# Patient Record
Sex: MALE | Race: WHITE | URBAN - METROPOLITAN AREA
[De-identification: names, ages, dates, MRNs, and addresses within clinical notes are randomized per-mention and may not be internally consistent; named-entity substitution may affect disease eponyms.]

---

## 2020-07-15 ENCOUNTER — OFFICE VISIT (OUTPATIENT)
Dept: FAMILY MEDICINE CLINIC | Facility: CLINIC | Age: 35
End: 2020-07-15

## 2020-07-15 ENCOUNTER — HOSPITAL ENCOUNTER (OUTPATIENT)
Dept: LAB | Age: 35
Discharge: HOME | End: 2020-07-15
Payer: SELF-PAY

## 2020-07-15 VITALS
RESPIRATION RATE: 16 BRPM | HEIGHT: 72 IN | TEMPERATURE: 98 F | WEIGHT: 193.2 LBS | OXYGEN SATURATION: 95 % | SYSTOLIC BLOOD PRESSURE: 120 MMHG | BODY MASS INDEX: 26.17 KG/M2 | DIASTOLIC BLOOD PRESSURE: 83 MMHG | HEART RATE: 76 BPM

## 2020-07-15 DIAGNOSIS — R35.0 INCREASED URINARY FREQUENCY: ICD-10-CM

## 2020-07-15 DIAGNOSIS — R53.81 MALAISE: ICD-10-CM

## 2020-07-15 DIAGNOSIS — Z82.61 FAMILY HISTORY OF RHEUMATOID ARTHRITIS: ICD-10-CM

## 2020-07-15 DIAGNOSIS — R42 DIZZINESS: ICD-10-CM

## 2020-07-15 DIAGNOSIS — Z76.89 ESTABLISHING CARE WITH NEW DOCTOR, ENCOUNTER FOR: ICD-10-CM

## 2020-07-15 DIAGNOSIS — M79.10 MYALGIA: Primary | ICD-10-CM

## 2020-07-15 DIAGNOSIS — M79.10 MYALGIA: ICD-10-CM

## 2020-07-15 LAB
25(OH)D3 SERPL-MCNC: 34.6 NG/ML (ref 30–100)
ALBUMIN SERPL-MCNC: 4.3 G/DL (ref 3.4–5)
ALBUMIN/GLOB SERPL: 1.5 {RATIO} (ref 0.8–1.7)
ALP SERPL-CCNC: 58 U/L (ref 45–117)
ALT SERPL-CCNC: 26 U/L (ref 16–61)
ANION GAP SERPL CALC-SCNC: 6 MMOL/L (ref 3–18)
AST SERPL-CCNC: 17 U/L (ref 10–38)
BASOPHILS # BLD: 0 K/UL (ref 0–0.1)
BASOPHILS NFR BLD: 0 % (ref 0–2)
BILIRUB SERPL-MCNC: 0.4 MG/DL (ref 0.2–1)
BUN SERPL-MCNC: 16 MG/DL (ref 7–18)
BUN/CREAT SERPL: 14 (ref 12–20)
CALCIUM SERPL-MCNC: 9.2 MG/DL (ref 8.5–10.1)
CHLORIDE SERPL-SCNC: 110 MMOL/L (ref 100–111)
CK SERPL-CCNC: 117 U/L (ref 39–308)
CO2 SERPL-SCNC: 29 MMOL/L (ref 21–32)
CREAT SERPL-MCNC: 1.16 MG/DL (ref 0.6–1.3)
DIFFERENTIAL METHOD BLD: ABNORMAL
EOSINOPHIL # BLD: 0.2 K/UL (ref 0–0.4)
EOSINOPHIL NFR BLD: 3 % (ref 0–5)
ERYTHROCYTE [DISTWIDTH] IN BLOOD BY AUTOMATED COUNT: 13.6 % (ref 11.6–14.5)
ERYTHROCYTE [SEDIMENTATION RATE] IN BLOOD: 1 MM/HR (ref 0–15)
EST. AVERAGE GLUCOSE BLD GHB EST-MCNC: 100 MG/DL
GLOBULIN SER CALC-MCNC: 2.9 G/DL (ref 2–4)
GLUCOSE SERPL-MCNC: 75 MG/DL (ref 74–99)
HBA1C MFR BLD: 5.1 % (ref 4.2–5.6)
HCT VFR BLD AUTO: 49.7 % (ref 36–48)
HGB BLD-MCNC: 15.9 G/DL (ref 13–16)
LYMPHOCYTES # BLD: 2.2 K/UL (ref 0.9–3.6)
LYMPHOCYTES NFR BLD: 35 % (ref 21–52)
MCH RBC QN AUTO: 30.1 PG (ref 24–34)
MCHC RBC AUTO-ENTMCNC: 32 G/DL (ref 31–37)
MCV RBC AUTO: 94 FL (ref 74–97)
MONOCYTES # BLD: 0.4 K/UL (ref 0.05–1.2)
MONOCYTES NFR BLD: 7 % (ref 3–10)
NEUTS SEG # BLD: 3.5 K/UL (ref 1.8–8)
NEUTS SEG NFR BLD: 55 % (ref 40–73)
PLATELET # BLD AUTO: 371 K/UL (ref 135–420)
PMV BLD AUTO: 10.5 FL (ref 9.2–11.8)
POTASSIUM SERPL-SCNC: 4.6 MMOL/L (ref 3.5–5.5)
PROT SERPL-MCNC: 7.2 G/DL (ref 6.4–8.2)
RBC # BLD AUTO: 5.29 M/UL (ref 4.7–5.5)
RHEUMATOID FACT SERPL-ACNC: <10 IU/ML
SODIUM SERPL-SCNC: 145 MMOL/L (ref 136–145)
TSH SERPL DL<=0.05 MIU/L-ACNC: 0.44 UIU/ML (ref 0.36–3.74)
VIT B12 SERPL-MCNC: 513 PG/ML (ref 211–911)
WBC # BLD AUTO: 6.3 K/UL (ref 4.6–13.2)

## 2020-07-15 PROCEDURE — 86225 DNA ANTIBODY NATIVE: CPT

## 2020-07-15 PROCEDURE — 80053 COMPREHEN METABOLIC PANEL: CPT

## 2020-07-15 PROCEDURE — 86431 RHEUMATOID FACTOR QUANT: CPT

## 2020-07-15 PROCEDURE — 85025 COMPLETE CBC W/AUTO DIFF WBC: CPT

## 2020-07-15 PROCEDURE — 82607 VITAMIN B-12: CPT

## 2020-07-15 PROCEDURE — 82550 ASSAY OF CK (CPK): CPT

## 2020-07-15 PROCEDURE — 36415 COLL VENOUS BLD VENIPUNCTURE: CPT

## 2020-07-15 PROCEDURE — 83036 HEMOGLOBIN GLYCOSYLATED A1C: CPT

## 2020-07-15 PROCEDURE — 84443 ASSAY THYROID STIM HORMONE: CPT

## 2020-07-15 PROCEDURE — 85652 RBC SED RATE AUTOMATED: CPT

## 2020-07-15 PROCEDURE — 82306 VITAMIN D 25 HYDROXY: CPT

## 2020-07-15 RX ORDER — BISMUTH SUBSALICYLATE 262 MG
1 TABLET,CHEWABLE ORAL DAILY
COMMUNITY

## 2020-07-16 NOTE — PATIENT INSTRUCTIONS
Musculoskeletal Pain: Care Instructions  Your Care Instructions  Different problems with the bones, muscles, nerves, ligaments, and tendons in the body can cause pain. One or more areas of your body may ache or burn. Or they may feel tired, stiff, or sore. The medical term for this type of pain is musculoskeletal pain. It can have many different causes. Sometimes the pain is caused by an injury such as a strain or sprain. Or you might have pain from using one part of your body in the same way over and over again. This is called overuse. In some cases, the cause of the pain is another health problem such as arthritis or fibromyalgia. The doctor will examine you and ask you questions about your health to help find the cause of your pain. Blood tests or imaging tests like an X-ray may also be helpful. But sometimes doctors can't find a cause of the pain. Treatment depends on your symptoms and the cause of the pain, if known. The doctor has checked you carefully, but problems can develop later. If you notice any problems or new symptoms, get medical treatment right away. Follow-up care is a key part of your treatment and safety. Be sure to make and go to all appointments, and call your doctor if you are having problems. It's also a good idea to know your test results and keep a list of the medicines you take. How can you care for yourself at home? · Rest until you feel better. · Do not do anything that makes the pain worse. Return to exercise gradually if you feel better and your doctor says it's okay. · Be safe with medicines. Read and follow all instructions on the label. ¨ If the doctor gave you a prescription medicine for pain, take it as prescribed. ¨ If you are not taking a prescription pain medicine, ask your doctor if you can take an over-the-counter medicine. · Put ice or a cold pack on the area for 10 to 20 minutes at a time to ease pain. Put a thin cloth between the ice and your skin.   When should you call for help? Call your doctor now or seek immediate medical care if:  · You have new pain, or your pain gets worse. · You have new symptoms such as a fever, a rash, or chills. Watch closely for changes in your health, and be sure to contact your doctor if:  · You do not get better as expected. Where can you learn more? Go to [employer]  Enter Q624 in the search box to learn more about \"Musculoskeletal Pain: Care Instructions. \"   © 9579-5565 [employer]. Care instructions adapted under license by [facility] (which disclaims liability or warranty for this information). This care instruction is for use with your licensed healthcare professional. If you have questions about a medical condition or this instruction, always ask your healthcare professional. [address] any warranty or liability for your use of this information.   Content Version: 09.7.789071; Current as of: November 20, 2015

## 2020-07-17 LAB
CENTROMERE B AB SER-ACNC: <0.2 AI (ref 0–0.9)
CHROMATIN AB SERPL-ACNC: <0.2 AI (ref 0–0.9)
DSDNA AB SER-ACNC: <1 IU/ML (ref 0–9)
ENA JO1 AB SER-ACNC: <0.2 AI (ref 0–0.9)
ENA RNP AB SER-ACNC: 2 AI (ref 0–0.9)
ENA SCL70 AB SER-ACNC: <0.2 AI (ref 0–0.9)
ENA SM AB SER-ACNC: <0.2 AI (ref 0–0.9)
ENA SS-A AB SER-ACNC: <0.2 AI (ref 0–0.9)
ENA SS-B AB SER-ACNC: <0.2 AI (ref 0–0.9)
SEE BELOW, 164869: ABNORMAL

## 2020-07-19 NOTE — PROGRESS NOTES
HISTORY OF PRESENT ILLNESS  Jay Gaffney is a 38 y.o. male. HPI   Pt is being seen to [facility] care and for a few concerns. He has had muscle pain and tightness off and on the past few yrs along with malaise an ddizziness. He has tried to improve it naturally and has cut out alcohol, increased his water intake, regularly exercises, and has started taking magnesium and multivit as well as tumeric. His symptoms greatly improved but are still there at times. He is concerned and he does have a strong fam hx of RA (mom, aunt, and grandmother). He also has had some urinary frequency. Denies dysuria, hematuria, pelvic pain, flank pain, N/V/D, and decreased urinary stream.    No Known Allergies    Current Outpatient Medications   Medication Sig    multivitamin (ONE A DAY) tablet Take 1 Tab by mouth daily. No current facility-administered medications for this visit. Review of Systems   Constitutional: Positive for malaise/fatigue. Negative for chills and fever. HENT: Negative. Negative for congestion, ear pain, sore throat and tinnitus. Eyes: Negative. Negative for blurred vision, double vision and photophobia. Respiratory: Negative. Negative for cough, shortness of breath and wheezing. Cardiovascular: Negative. Negative for chest pain, palpitations and leg swelling. Gastrointestinal: Negative. Negative for abdominal pain, heartburn, nausea and vomiting. Genitourinary: Positive for frequency. Negative for dysuria, hematuria and urgency. Musculoskeletal: Positive for myalgias. Negative for back pain, joint pain and neck pain. Skin: Negative. Negative for itching and rash. Neurological: Positive for dizziness. Negative for tingling, tremors and headaches. Psychiatric/Behavioral: Negative. Negative for depression and memory loss. The patient is not nervous/anxious and does not have insomnia.       Visit Vitals  /83 (BP 1 Location: Left arm, BP Patient Position: Sitting)   Pulse 76 Temp 98 °F (36.7 °C) (Temporal)   Resp 16   Ht 6' (1.829 m)   Wt 193 lb 3.2 oz (87.6 kg)   SpO2 95%   BMI 26.20 kg/m²       Physical Exam  Constitutional:       General: He is not in acute distress. Appearance: Normal appearance. He is well-developed and normal weight. He is not ill-appearing. HENT:      Head: Normocephalic and atraumatic. Right Ear: External ear normal.      Left Ear: External ear normal.      Nose: Nose normal.   Eyes:      Conjunctiva/sclera: Conjunctivae normal.      Pupils: Pupils are equal, round, and reactive to light. Neck:      Musculoskeletal: Normal range of motion and neck supple. Thyroid: No thyromegaly. Cardiovascular:      Rate and Rhythm: Normal rate and regular rhythm. Heart sounds: Normal heart sounds. No murmur. No friction rub. No gallop. Pulmonary:      Effort: Pulmonary effort is normal. No respiratory distress. Breath sounds: Normal breath sounds. No wheezing or rales. Abdominal:      General: Bowel sounds are normal. There is no distension. Palpations: Abdomen is soft. There is no mass. Tenderness: There is no abdominal tenderness. There is no guarding or rebound. Musculoskeletal: Normal range of motion. General: No tenderness. Skin:     General: Skin is warm and dry. Neurological:      Mental Status: He is alert and oriented to person, place, and time. Psychiatric:         Behavior: Behavior normal.         ASSESSMENT and PLAN    ICD-10-CM ICD-9-CM    1. Myalgia  L73.18 254.1 METABOLIC PANEL, COMPREHENSIVE      CK      SED RATE (ESR)      RHEUMATOID FACTOR, QT      MARTIN COMPREHENSIVE PANEL   2. Dizziness  R42 780.4    3. Malaise  I40.72 306.51 METABOLIC PANEL, COMPREHENSIVE      TSH 3RD GENERATION      CBC WITH AUTOMATED DIFF      VITAMIN B12      VITAMIN D, 25 HYDROXY   4. Increased urinary frequency  R35.0 788.41 HEMOGLOBIN A1C WITH EAG   5.  Family history of rheumatoid arthritis  Z82.61 V17.7 CK      SED RATE (ESR)      RHEUMATOID FACTOR, QT      MARTIN COMPREHENSIVE PANEL   6. Establishing care with new doctor, encounter for  Z76.89 V65.8      Follow-up and Dispositions    · Return in about 1 week (around 7/22/2020) for follow up labs. Pt expressed understanding of visit summary and plans for any follow ups or referrals as well as any medications prescribed.

## 2020-09-09 ENCOUNTER — TELEPHONE (OUTPATIENT)
Dept: FAMILY MEDICINE CLINIC | Facility: CLINIC | Age: 35
End: 2020-09-09

## 2020-09-09 DIAGNOSIS — M79.10 MYALGIA: Primary | ICD-10-CM

## 2020-09-09 DIAGNOSIS — Z82.61 FAMILY HISTORY OF RHEUMATOID ARTHRITIS: ICD-10-CM

## 2020-09-09 DIAGNOSIS — R76.8 POSITIVE SM/RNP ANTIBODY: ICD-10-CM

## 2020-09-14 NOTE — TELEPHONE ENCOUNTER
Please advise pt one of his antibodies suggestive of lupus was positive so I am referring him to rheumatology for further eval. All other labs were normal.

## 2021-03-01 ENCOUNTER — TELEPHONE (OUTPATIENT)
Dept: FAMILY MEDICINE CLINIC | Age: 36
End: 2021-03-01

## 2021-03-01 NOTE — TELEPHONE ENCOUNTER
The patient is requesting a call back to discuss his lab results. The patient is also requesting a physical hard copy of his lab results to be mailed to him.